# Patient Record
Sex: FEMALE | Race: WHITE | NOT HISPANIC OR LATINO | URBAN - METROPOLITAN AREA
[De-identification: names, ages, dates, MRNs, and addresses within clinical notes are randomized per-mention and may not be internally consistent; named-entity substitution may affect disease eponyms.]

---

## 2023-05-25 ENCOUNTER — OFFICE VISIT (OUTPATIENT)
Dept: PEDIATRICS | Facility: CLINIC | Age: 23
End: 2023-05-25
Payer: COMMERCIAL

## 2023-05-25 VITALS — WEIGHT: 142 LBS | TEMPERATURE: 99.1 F | BODY MASS INDEX: 26.83 KG/M2

## 2023-05-25 DIAGNOSIS — F41.0 PANIC ATTACKS: ICD-10-CM

## 2023-05-25 DIAGNOSIS — K21.9 GASTROESOPHAGEAL REFLUX DISEASE, UNSPECIFIED WHETHER ESOPHAGITIS PRESENT: Primary | ICD-10-CM

## 2023-05-25 PROBLEM — F41.9 ANXIETY: Status: ACTIVE | Noted: 2023-05-25

## 2023-05-25 PROCEDURE — 1036F TOBACCO NON-USER: CPT | Performed by: PEDIATRICS

## 2023-05-25 PROCEDURE — 99214 OFFICE O/P EST MOD 30 MIN: CPT | Performed by: PEDIATRICS

## 2023-05-25 RX ORDER — ESCITALOPRAM OXALATE 10 MG/1
TABLET ORAL
COMMUNITY
Start: 2022-09-07 | End: 2023-05-25 | Stop reason: SDDI

## 2023-05-25 RX ORDER — HYDROXYZINE HYDROCHLORIDE 25 MG/1
TABLET, FILM COATED ORAL
Qty: 30 TABLET | Refills: 2 | Status: SHIPPED | OUTPATIENT
Start: 2023-05-25

## 2023-05-25 RX ORDER — HYDROXYZINE HYDROCHLORIDE 25 MG/1
TABLET, FILM COATED ORAL
COMMUNITY
Start: 2022-09-07 | End: 2023-05-25 | Stop reason: SDDI

## 2023-05-25 RX ORDER — DESOGESTREL AND ETHINYL ESTRADIOL 21-5 (28)
1 KIT ORAL DAILY
COMMUNITY
Start: 2023-01-10

## 2023-05-25 RX ORDER — DESOGESTREL AND ETHINYL ESTRADIOL 21-5 (28)
1 KIT ORAL DAILY
COMMUNITY
Start: 2022-07-20

## 2023-05-25 RX ORDER — HYDROCORTISONE 25 MG/G
CREAM TOPICAL 2 TIMES DAILY
COMMUNITY
Start: 2020-12-03

## 2023-05-25 NOTE — PROGRESS NOTES
Chief Complaint   Patient presents with    Heartburn     Acid reflux vs panic attacks?         Here with  herself    HPI  Burning sensation a few days ago at [employer] and also after some soda. Took Tums with relief . Some excess belching this week  Hx of anxiety and depression and would not take the medications prescribed because she is scared    Pertinent Negatives:  SOB, nausea, vomiting      Exam:  Temp 37.3 °C (99.1 °F)   Wt 64.4 kg (142 lb)   BMI 26.83 kg/m²   General: Vital signs reviewed, alert, no acute distress  Skin: rash No  Ears: Right TM: normal color and  landmarks   Left TM: normal color and  landmarks   Nose:   no congestion  without drainage  Throat: no lesion, tonsils  + 1  without erythema  Neck: Supple, no swollen nodes  Lungs: clear to auscultation  CV: RR, no murmur  Abdomen: soft, +BS, non tender to palpation,  no mass, no guarding        1. Panic attacks  Extended discussion regarding  SSRI's the benefits and side effects. She will think about initiation for treatment of generalized anxiety  Willing to try hydroxyzine     - hydrOXYzine HCL (Atarax) 25 mg tablet; 1 tablet oral in AM and at bedtime as needed  Dispense: 30 tablet; Refill: 2    2. Gastroesophageal reflux disease, unspecified whether esophagitis present  Start Omeprazole 20 mg in Am daily for 1 week, then as needed  Discussed dietary management including trigger foods and beverages     Follow up if new or worsening symptoms, or if illness fails to subside by 7  days

## 2023-10-01 ENCOUNTER — OFFICE VISIT (OUTPATIENT)
Dept: URGENT CARE | Facility: CLINIC | Age: 23
End: 2023-10-01
Payer: COMMERCIAL

## 2023-10-01 VITALS
WEIGHT: 148 LBS | BODY MASS INDEX: 27.96 KG/M2 | SYSTOLIC BLOOD PRESSURE: 128 MMHG | DIASTOLIC BLOOD PRESSURE: 84 MMHG | OXYGEN SATURATION: 97 % | RESPIRATION RATE: 18 BRPM | HEART RATE: 96 BPM

## 2023-10-01 DIAGNOSIS — H93.8X3 EAR FULLNESS, BILATERAL: ICD-10-CM

## 2023-10-01 DIAGNOSIS — J30.2 SEASONAL ALLERGIC RHINITIS, UNSPECIFIED TRIGGER: Primary | ICD-10-CM

## 2023-10-01 PROCEDURE — 99203 OFFICE O/P NEW LOW 30 MIN: CPT | Performed by: PHYSICIAN ASSISTANT

## 2023-10-01 PROCEDURE — 1036F TOBACCO NON-USER: CPT | Performed by: PHYSICIAN ASSISTANT

## 2023-10-01 RX ADMIN — Medication 10 MG: at 16:25

## 2023-10-01 ASSESSMENT — ENCOUNTER SYMPTOMS
VOMITING: 0
COLOR CHANGE: 0
COUGH: 0
BLOOD IN STOOL: 0
FEVER: 0
EYE REDNESS: 0
BACK PAIN: 0
ALLERGIC/IMMUNOLOGIC NEGATIVE: 1
ABDOMINAL PAIN: 0
HEMATOLOGIC/LYMPHATIC NEGATIVE: 1
SINUS PRESSURE: 0
DIARRHEA: 0
EYE PAIN: 0
FLANK PAIN: 0
ARTHRALGIAS: 0
PSYCHIATRIC NEGATIVE: 1
ENDOCRINE NEGATIVE: 1
FATIGUE: 0
WOUND: 0
NEUROLOGICAL NEGATIVE: 1
WHEEZING: 0
RHINORRHEA: 0
APPETITE CHANGE: 0
SORE THROAT: 0
SHORTNESS OF BREATH: 0
ACTIVITY CHANGE: 0
NAUSEA: 0
DYSURIA: 0
EYE DISCHARGE: 0

## 2023-10-01 ASSESSMENT — PAIN SCALES - GENERAL: PAINLEVEL: 0-NO PAIN

## 2023-10-01 NOTE — PATIENT INSTRUCTIONS
Discussed with the patient that I suspect that her symptoms of ear fullness and congestion are secondary to allergic rhinitis.  Recommending Zyrtec daily, Flonase 2 sprays each nostril once per day  Patient received single dose Decadron here in the office

## 2023-10-01 NOTE — PROGRESS NOTES
Subjective   Patient ID: Layla Martinez is a 23 y.o. female who presents for Ear Fullness.    Ear Fullness  Associated symptoms: congestion and ear pain    Associated symptoms: no abdominal pain, no chest pain, no cough, no diarrhea, no fatigue, no fever, no nausea, no rash, no rhinorrhea, no shortness of breath, no sore throat, no vomiting and no wheezing      This is an otherwise healthy 23-year-old female here today with complaints of fullness over the course of the last 3 days as well as some ongoing nasal congestion runny nose postnasal drip symptoms.  She states that she believes that she may have allergies but she has never been tested.  She does note that she has been using Sudafed and Mucinex without significant relief.  Appears to continue to cough and feel full    Review of Systems   Constitutional:  Negative for activity change, appetite change, fatigue and fever.   HENT:  Positive for congestion and ear pain. Negative for rhinorrhea, sinus pressure and sore throat.    Eyes:  Negative for pain, discharge and redness.   Respiratory:  Negative for cough, shortness of breath and wheezing.    Cardiovascular:  Negative for chest pain and leg swelling.   Gastrointestinal:  Negative for abdominal pain, blood in stool, diarrhea, nausea and vomiting.   Endocrine: Negative.    Genitourinary:  Negative for dysuria and flank pain.   Musculoskeletal:  Negative for arthralgias, back pain and gait problem.   Skin:  Negative for color change, rash and wound.   Allergic/Immunologic: Negative.    Neurological: Negative.    Hematological: Negative.    Psychiatric/Behavioral: Negative.         Objective   Physical Exam  Constitutional:       General: She is not in acute distress.     Appearance: Normal appearance. She is not ill-appearing, toxic-appearing or diaphoretic.   HENT:      Head: Normocephalic and atraumatic.      Comments: Fluid level behind bilateral TMs no injection no erythema no evidence of otitis media      Right Ear: Ear canal normal.      Left Ear: Ear canal normal.      Nose: Congestion and rhinorrhea present.      Mouth/Throat:      Mouth: Mucous membranes are moist.      Pharynx: Oropharynx is clear.   Eyes:      Conjunctiva/sclera: Conjunctivae normal.   Cardiovascular:      Rate and Rhythm: Normal rate and regular rhythm.      Heart sounds: No murmur heard.  Pulmonary:      Effort: Pulmonary effort is normal. No respiratory distress.      Breath sounds: Normal breath sounds. No wheezing.   Musculoskeletal:         General: No swelling, tenderness, deformity or signs of injury. Normal range of motion.      Cervical back: Normal range of motion and neck supple.   Skin:     General: Skin is warm and dry.      Findings: No erythema or rash.   Neurological:      General: No focal deficit present.      Mental Status: She is alert and oriented to person, place, and time.      Gait: Gait normal.         Assessment/Plan   Problem List Items Addressed This Visit       Seasonal allergic rhinitis - Primary    Relevant Medications    dexAMETHasone (Decadron) 10 mg/mL oral liquid 10 mg (Start on 10/1/2023  4:30 PM)    Ear fullness, bilateral     Discussed with the patient that I suspect that her symptoms of ear fullness and congestion are secondary to allergic rhinitis.  Recommending Zyrtec daily, Flonase 2 sprays each nostril once per day  Patient received single dose Decadron here in the office         Relevant Medications    dexAMETHasone (Decadron) 10 mg/mL oral liquid 10 mg (Start on 10/1/2023  4:30 PM)

## 2025-01-21 ENCOUNTER — APPOINTMENT (OUTPATIENT)
Dept: OBSTETRICS AND GYNECOLOGY | Facility: CLINIC | Age: 25
End: 2025-01-21
Payer: COMMERCIAL

## 2025-01-21 ENCOUNTER — OFFICE VISIT (OUTPATIENT)
Dept: URGENT CARE | Age: 25
End: 2025-01-21
Payer: COMMERCIAL

## 2025-01-21 VITALS
SYSTOLIC BLOOD PRESSURE: 122 MMHG | WEIGHT: 153 LBS | BODY MASS INDEX: 28.89 KG/M2 | DIASTOLIC BLOOD PRESSURE: 80 MMHG | HEIGHT: 61 IN

## 2025-01-21 VITALS
RESPIRATION RATE: 16 BRPM | TEMPERATURE: 98.6 F | DIASTOLIC BLOOD PRESSURE: 84 MMHG | SYSTOLIC BLOOD PRESSURE: 130 MMHG | OXYGEN SATURATION: 98 % | HEART RATE: 92 BPM

## 2025-01-21 DIAGNOSIS — Z30.09 BIRTH CONTROL COUNSELING: ICD-10-CM

## 2025-01-21 DIAGNOSIS — J02.9 SORE THROAT: ICD-10-CM

## 2025-01-21 DIAGNOSIS — J03.90 ACUTE TONSILLITIS, UNSPECIFIED ETIOLOGY: Primary | ICD-10-CM

## 2025-01-21 LAB
POC RAPID MONO: NEGATIVE
POC RAPID STREP: NEGATIVE

## 2025-01-21 PROCEDURE — 3008F BODY MASS INDEX DOCD: CPT | Performed by: OBSTETRICS & GYNECOLOGY

## 2025-01-21 PROCEDURE — 1036F TOBACCO NON-USER: CPT | Performed by: NURSE PRACTITIONER

## 2025-01-21 PROCEDURE — 87880 STREP A ASSAY W/OPTIC: CPT | Performed by: NURSE PRACTITIONER

## 2025-01-21 PROCEDURE — 99213 OFFICE O/P EST LOW 20 MIN: CPT | Performed by: OBSTETRICS & GYNECOLOGY

## 2025-01-21 PROCEDURE — 1036F TOBACCO NON-USER: CPT | Performed by: OBSTETRICS & GYNECOLOGY

## 2025-01-21 PROCEDURE — 99213 OFFICE O/P EST LOW 20 MIN: CPT | Performed by: NURSE PRACTITIONER

## 2025-01-21 PROCEDURE — 86308 HETEROPHILE ANTIBODY SCREEN: CPT | Performed by: NURSE PRACTITIONER

## 2025-01-21 RX ORDER — CEPHALEXIN 250 MG/5ML
500 POWDER, FOR SUSPENSION ORAL 2 TIMES DAILY
Qty: 200 ML | Refills: 0 | Status: SHIPPED | OUTPATIENT
Start: 2025-01-21 | End: 2025-01-31

## 2025-01-21 ASSESSMENT — ENCOUNTER SYMPTOMS
SORE THROAT: 1
COUGH: 0
FEVER: 1

## 2025-01-21 ASSESSMENT — PAIN SCALES - GENERAL: PAINLEVEL_OUTOF10: 4

## 2025-01-21 NOTE — PROGRESS NOTES
Chief Complaint   Patient presents with    Contraception     PT is here to discuss going back on BC.  PT had stopped taking BC last year at this time due to increased anxiety.  PT would like to talk about different options.   In the past, patient was on combined hormonal birth control pills, volnea.    Had some issues with anxiety and as a result stopped the birth control pills.  Did not notice an immediate improvement in her anxiety.  1 year later, she feels her anxiety is better and like to restart birth control.    Discussed all contraceptive methods and she would like to restart a combined estrogen progesterone birth control pill.    REVIEW OF SYSTEMS    Please see HPI for reported pertinent positives, which would supersede this ROS    Constitutional:  Denies fever, chills, wt gain or loss, fatigue    Genito-Urinary:  Denies genital lesion or sores, vaginal dryness, itching  or pain.  No abnormal vaginal discharge or unexplained vaginal bleeding.  No dysuria, urinary incontinence or frequency.  Denies pelvic pain, dysmenorrhea or dyspareunia.    Eyes:  Denies vision changes, dryness  ENT:  No hearing loss, sinus pain or congestion, nosebleeds  Cardiovascular:  No chest pain or palpitations  Respiratory:  No SOB, cough, wheezing  GI:  No Nausea, vomiting, diarrhea, constipation, abdominal pain  Musculoskeletal:  No new back pain. joint pain, peripheral edema  Skin:  No rash or skin lesion  Neurologic:  No HA, numbness or dizziness  Psychiatric:  No new anxiety or depression  Endocrine:  No loss of hair or hirsutism  Hematologic/lymphatic:  No swollen lymph nodes.  No reported tendency for easy bruising or bleeding    Patient admits to no other systemic complaints      Vitals:    01/21/25 1201   BP: 122/80       PHYSICAL EXAM      PSYCH:  Pt is alert, oriented and cooperative    SKIN: warm, dry, w/o lesion    HEAD AND FACE:  External inspection of eyes, ears, functional cranial nerves normal and intact    THYROID:   No thyromegaly    CARDIOVASCULAR:  Warm and well Perfused    PULMONARY:  No respiratory distress    Assessment/Plan    Diagnoses and all orders for this visit:  Birth control counseling  -     norgestrel-ethinyl estradioL (Low-ogestrel,Cryselle) 0.3-30 mg-mcg tablet; Take 1 tablet by mouth once daily.

## 2025-01-21 NOTE — PROGRESS NOTES
Subjective   Patient ID: Layla Martinez is a 25 y.o. female. They present today with a chief complaint of Sore Throat (Fever, ST, swollen glands X 4 days. ).    History of Present Illness    Sore Throat   Pertinent negatives include no congestion or coughing.       Patient presents to urgent care for complaints of sore throat, fever, and swollen glands for 4 days.  Reports a fever as high as 101.5 °F.  She has been using DayQuil, Tylenol, and ibuprofen.  She last had ibuprofen this morning.    Past Medical History  Allergies as of 01/21/2025 - Reviewed 01/21/2025   Allergen Reaction Noted    Amoxicillin GI Upset 01/21/2025       (Not in a hospital admission)       No past medical history on file.    No past surgical history on file.     reports that she has never smoked. She has never used smokeless tobacco.    Review of Systems  Review of Systems   Constitutional:  Positive for fever.   HENT:  Positive for sore throat. Negative for congestion.    Respiratory:  Negative for cough.                                   Objective    Vitals:    01/21/25 0902   BP: 130/84   Pulse: 92   Resp: 16   Temp: 37 °C (98.6 °F)   SpO2: 98%     Patient's last menstrual period was 01/07/2025.    Physical Exam  Vitals reviewed.   Constitutional:       Appearance: Normal appearance.   HENT:      Right Ear: Tympanic membrane, ear canal and external ear normal.      Left Ear: Tympanic membrane, ear canal and external ear normal.      Nose: Nose normal.      Mouth/Throat:      Mouth: Mucous membranes are moist.      Pharynx: Oropharyngeal exudate and posterior oropharyngeal erythema present.      Tonsils: 3+ on the right. 3+ on the left.   Cardiovascular:      Rate and Rhythm: Normal rate and regular rhythm.      Heart sounds: Normal heart sounds.   Pulmonary:      Effort: Pulmonary effort is normal.      Breath sounds: Normal breath sounds and air entry.   Lymphadenopathy:      Cervical: Cervical adenopathy present.      Right cervical:  Posterior cervical adenopathy present.      Left cervical: Posterior cervical adenopathy present.   Skin:     General: Skin is warm and dry.   Neurological:      Mental Status: She is alert.         Procedures    Point of Care Test & Imaging Results from this visit  Results for orders placed or performed in visit on 01/21/25   POCT rapid strep A manually resulted   Result Value Ref Range    POC Rapid Strep Negative Negative   POCT Infectious mononucleosis antibody manually resulted   Result Value Ref Range    POC Rapid Mono Negative Negative      No results found.    Diagnostic study results (if any) were reviewed by BOB Mckeon.    Assessment/Plan   Allergies, medications, history, and pertinent labs/EKGs/Imaging reviewed by BOB Mckeon.     Medical Decision Making  Rapid strep and mono were both negative.  On physical assessment tonsils were erythematous and had exudate.  Patient did have cervical adenopathy.  Will treat patient with cephalexin for acute tonsillitis.  Patient was told to continue over-the-counter medications as needed.  Make sure you are staying well-hydrated with fluids.  If your symptoms are not improving or worsening in the next 3 to 5 days follow-up with PCP or return to urgent care if needed.    At time of discharge patient was clinically well-appearing and HDS for outpatient management. The patient and/or family was educated regarding diagnosis, supportive care, OTC and Rx medications. The patient and/or family was given the opportunity to ask questions prior to discharge.  They verbalized understanding of my discussion of the plans for treatment, expected course, indications to return to  or seek further evaluation in ED, and the need for timely follow up as directed.   They were provided with a work/school excuse if requested.      Orders and Diagnoses  Diagnoses and all orders for this visit:  Acute tonsillitis, unspecified etiology  -     cephalexin  (Keflex) 250 mg/5 mL suspension; Take 10 mL (500 mg) by mouth 2 times a day for 10 days.  Sore throat  -     POCT rapid strep A manually resulted  -     POCT Infectious mononucleosis antibody manually resulted      Medical Admin Record      Patient disposition: Home    Electronically signed by BOB Mckeon  9:14 AM

## 2025-03-10 ENCOUNTER — OFFICE VISIT (OUTPATIENT)
Dept: URGENT CARE | Age: 25
End: 2025-03-10
Payer: COMMERCIAL

## 2025-03-10 VITALS
DIASTOLIC BLOOD PRESSURE: 81 MMHG | HEART RATE: 88 BPM | RESPIRATION RATE: 16 BRPM | OXYGEN SATURATION: 99 % | SYSTOLIC BLOOD PRESSURE: 118 MMHG | TEMPERATURE: 98.2 F

## 2025-03-10 DIAGNOSIS — R30.0 DYSURIA: ICD-10-CM

## 2025-03-10 DIAGNOSIS — N89.8 VAGINAL DISCHARGE: Primary | ICD-10-CM

## 2025-03-10 LAB
POC APPEARANCE, URINE: CLEAR
POC BILIRUBIN, URINE: NEGATIVE
POC BLOOD, URINE: NEGATIVE
POC COLOR, URINE: YELLOW
POC GLUCOSE, URINE: NEGATIVE MG/DL
POC KETONES, URINE: NEGATIVE MG/DL
POC LEUKOCYTES, URINE: NEGATIVE
POC NITRITE,URINE: NEGATIVE
POC PH, URINE: 6 PH
POC PROTEIN, URINE: NEGATIVE MG/DL
POC SPECIFIC GRAVITY, URINE: 1.02
POC UROBILINOGEN, URINE: 0.2 EU/DL
PREGNANCY TEST URINE, POC: NEGATIVE

## 2025-03-10 PROCEDURE — 81003 URINALYSIS AUTO W/O SCOPE: CPT | Performed by: STUDENT IN AN ORGANIZED HEALTH CARE EDUCATION/TRAINING PROGRAM

## 2025-03-10 PROCEDURE — 81025 URINE PREGNANCY TEST: CPT | Performed by: STUDENT IN AN ORGANIZED HEALTH CARE EDUCATION/TRAINING PROGRAM

## 2025-03-10 PROCEDURE — 99213 OFFICE O/P EST LOW 20 MIN: CPT | Performed by: STUDENT IN AN ORGANIZED HEALTH CARE EDUCATION/TRAINING PROGRAM

## 2025-03-10 PROCEDURE — 1036F TOBACCO NON-USER: CPT | Performed by: STUDENT IN AN ORGANIZED HEALTH CARE EDUCATION/TRAINING PROGRAM

## 2025-03-10 RX ORDER — FLUCONAZOLE 150 MG/1
150 TABLET ORAL SEE ADMIN INSTRUCTIONS
Qty: 2 TABLET | Refills: 0 | Status: SHIPPED | OUTPATIENT
Start: 2025-03-10 | End: 2025-03-11

## 2025-03-10 ASSESSMENT — ENCOUNTER SYMPTOMS
CHILLS: 0
CONSTIPATION: 0
ABDOMINAL PAIN: 1
SORE THROAT: 0
FLANK PAIN: 1
HEADACHES: 0
BACK PAIN: 1
HEMATURIA: 0
FEVER: 0
FREQUENCY: 0
DIARRHEA: 0
VOMITING: 0
NAUSEA: 0
DYSURIA: 1
ANOREXIA: 0

## 2025-03-10 ASSESSMENT — PAIN SCALES - GENERAL: PAINLEVEL_OUTOF10: 10-WORST PAIN EVER

## 2025-03-10 NOTE — PROGRESS NOTES
Subjective   Patient ID: Layla Martinez is a 25 y.o. female. They present today with a chief complaint of Female Dysuria (Burning urination, low abd cramping, vaginal itching X 7 days intermittent. ).    History of Present Illness    Female Dysuria  Associated symptoms: abdominal pain    Associated symptoms: no diarrhea, no fever, no headaches, no nausea, no rash, no sore throat and no vomiting    Female  Problem  The patient's primary symptoms include genital itching, a genital odor and vaginal discharge. The patient's pertinent negatives include no genital lesions, genital rash, missed menses or vaginal bleeding. This is a new problem. The current episode started in the past 7 days. The problem occurs 2 to 4 times per day. The problem has been unchanged. The pain is mild. The problem affects both sides. She is not pregnant. Associated symptoms include abdominal pain, discolored urine, dysuria and painful intercourse. Pertinent negatives include no anorexia, chills, constipation, diarrhea, fever, frequency, headaches, hematuria, joint pain, joint swelling, nausea, rash, sore throat, urgency or vomiting. The vaginal discharge was copious and white. She has not been passing clots. She has not been passing tissue. The symptoms are aggravated by eating, intercourse and urinating. She is sexually active. No, her partner does not have an STD. She uses condoms for contraception. Her menstrual history has been regular.     Patient presents to the urgent care for a chief complaint of burning with urination and lower abdominal cramping vaginal itching and vaginal discharge intermittently over the last 7 days, patient states does have a history of yeast infection feels somewhat different, she denies any urinary frequency urgency or feeling of incomplete void, patient denies any known concern for STI but is requesting urine to be sent out for STI testing no report of fevers chills vomiting or diarrhea, no report of severe  abdominal pain or back pain    Past Medical History  Allergies as of 03/10/2025 - Reviewed 03/10/2025   Allergen Reaction Noted    Amoxicillin GI Upset and Diarrhea 01/21/2025       (Not in a hospital admission)       No past medical history on file.    No past surgical history on file.     reports that she has never smoked. She has never used smokeless tobacco. She reports that she does not use drugs.    Review of Systems  Review of Systems   Constitutional:  Negative for chills and fever.   HENT:  Negative for sore throat.    Gastrointestinal:  Positive for abdominal pain. Negative for anorexia, constipation, diarrhea, nausea and vomiting.   Genitourinary:  Positive for dysuria and vaginal discharge. Negative for frequency, hematuria, missed menses and urgency.   Musculoskeletal:  Negative for joint pain.   Skin:  Negative for rash.   Neurological:  Negative for headaches.                                  Objective    Vitals:    03/10/25 1712   BP: 118/81   Pulse: 88   Resp: 16   Temp: 36.8 °C (98.2 °F)   SpO2: 99%     Patient's last menstrual period was 02/14/2025 (exact date).    Physical Exam  Vitals and nursing note reviewed.   Constitutional:       General: She is not in acute distress.     Appearance: Normal appearance. She is not ill-appearing, toxic-appearing or diaphoretic.   Cardiovascular:      Rate and Rhythm: Normal rate.   Pulmonary:      Effort: Pulmonary effort is normal. No respiratory distress.      Breath sounds: Normal breath sounds.   Abdominal:      Tenderness: There is no right CVA tenderness or left CVA tenderness.   Neurological:      General: No focal deficit present.      Mental Status: She is alert and oriented to person, place, and time.   Psychiatric:         Mood and Affect: Mood normal.         Behavior: Behavior normal.         Procedures    Point of Care Test & Imaging Results from this visit  Results for orders placed or performed in visit on 03/10/25   POCT pregnancy, urine  manually resulted   Result Value Ref Range    Preg Test, Ur Negative Negative   POCT UA Automated manually resulted   Result Value Ref Range    POC Color, Urine Yellow Straw, Yellow, Light-Yellow    POC Appearance, Urine Clear Clear    POC Glucose, Urine NEGATIVE NEGATIVE mg/dl    POC Bilirubin, Urine NEGATIVE NEGATIVE    POC Ketones, Urine NEGATIVE NEGATIVE mg/dl    POC Specific Gravity, Urine 1.020 1.005 - 1.035    POC Blood, Urine NEGATIVE NEGATIVE    POC PH, Urine 6.0 No Reference Range Established PH    POC Protein, Urine NEGATIVE NEGATIVE mg/dl    POC Urobilinogen, Urine 0.2 0.2, 1.0 EU/DL    Poc Nitrite, Urine NEGATIVE NEGATIVE    POC Leukocytes, Urine NEGATIVE NEGATIVE      No results found.    Diagnostic study results (if any) were reviewed by Tristin Loyola PA-C.    Assessment/Plan   Allergies, medications, history, and pertinent labs/EKGs/Imaging reviewed by Tristin Loyola PA-C.     Medical Decision Making  I did discuss with patient that urinalysis not truly indicative of urinary tract infection will send urine for culture we will treat based on positive results and sensitivity.  Patient will be started on Diflucan as report of bread/yeasty smell along with white vaginal discharge, I did discuss with patient if worsening symptoms or no resolution or regression of symptoms patient is to follow-up with OB/GYN did discuss with patient we will treat for STI based on positive results.    Orders and Diagnoses  Diagnoses and all orders for this visit:  Vaginal discharge  -     Urine Culture  -     C. trachomatis / N. gonorrhoeae, Amplified, Urogenital  -     Trichomonas vaginalis, Amplified  -     fluconazole (Diflucan) 150 mg tablet; Take 1 tablet (150 mg) by mouth see administration instructions for 1 day. Take one tab now. Repeat in 7 days if symptoms persist.  Dysuria  -     POCT pregnancy, urine manually resulted  -     POCT UA Automated manually resulted  -     Urine Culture      Medical Admin  Record      Patient disposition: Home    Electronically signed by Tristin Loyola PA-C  5:28 PM

## 2025-03-12 LAB
BACTERIA UR CULT: NORMAL
C TRACH RRNA SPEC QL NAA+PROBE: NOT DETECTED
N GONORRHOEA RRNA SPEC QL NAA+PROBE: NOT DETECTED
QUEST GC CT AMPLIFIED (ALWAYS MESSAGE): NORMAL
T VAGINALIS RRNA SPEC QL NAA+PROBE: NOT DETECTED

## 2025-08-07 ENCOUNTER — APPOINTMENT (OUTPATIENT)
Dept: DERMATOLOGY | Facility: CLINIC | Age: 25
End: 2025-08-07
Payer: COMMERCIAL